# Patient Record
Sex: MALE | Race: WHITE | NOT HISPANIC OR LATINO | URBAN - METROPOLITAN AREA
[De-identification: names, ages, dates, MRNs, and addresses within clinical notes are randomized per-mention and may not be internally consistent; named-entity substitution may affect disease eponyms.]

---

## 2017-02-20 ENCOUNTER — HOME CARE/HOSPICE - HEALTHEAST (OUTPATIENT)
Dept: HOSPICE | Facility: HOSPICE | Age: 82
End: 2017-02-20

## 2017-02-21 ENCOUNTER — HOME CARE/HOSPICE - HEALTHEAST (OUTPATIENT)
Dept: HOSPICE | Facility: HOSPICE | Age: 82
End: 2017-02-21

## 2017-02-22 ENCOUNTER — HOME CARE/HOSPICE - HEALTHEAST (OUTPATIENT)
Dept: HOSPICE | Facility: HOSPICE | Age: 82
End: 2017-02-22

## 2017-02-27 ENCOUNTER — OFFICE VISIT - HEALTHEAST (OUTPATIENT)
Dept: GERIATRICS | Facility: CLINIC | Age: 82
End: 2017-02-27

## 2017-02-27 DIAGNOSIS — R09.02 HYPOXIA: ICD-10-CM

## 2017-02-27 DIAGNOSIS — I63.9 CVA (CEREBRAL VASCULAR ACCIDENT) (H): ICD-10-CM

## 2017-02-27 DIAGNOSIS — G90.9 AUTONOMIC INSTABILITY: ICD-10-CM

## 2017-02-27 DIAGNOSIS — R05.9 COUGH: ICD-10-CM

## 2017-02-27 DIAGNOSIS — R53.1 ACUTE LEFT-SIDED WEAKNESS: ICD-10-CM

## 2017-03-02 ENCOUNTER — OFFICE VISIT - HEALTHEAST (OUTPATIENT)
Dept: GERIATRICS | Facility: CLINIC | Age: 82
End: 2017-03-02

## 2017-03-02 DIAGNOSIS — R09.02 HYPOXIA: ICD-10-CM

## 2017-03-02 DIAGNOSIS — I63.9 CVA (CEREBRAL VASCULAR ACCIDENT) (H): ICD-10-CM

## 2017-03-02 DIAGNOSIS — E86.0 DEHYDRATION: ICD-10-CM

## 2017-03-02 DIAGNOSIS — J18.9 PNEUMONIA: ICD-10-CM

## 2017-03-06 ENCOUNTER — OFFICE VISIT - HEALTHEAST (OUTPATIENT)
Dept: GERIATRICS | Facility: CLINIC | Age: 82
End: 2017-03-06

## 2017-03-06 DIAGNOSIS — E86.0 DEHYDRATION: ICD-10-CM

## 2017-03-06 DIAGNOSIS — R09.02 HYPOXIA: ICD-10-CM

## 2017-03-06 DIAGNOSIS — J18.9 PNEUMONIA: ICD-10-CM

## 2017-03-06 DIAGNOSIS — G90.9 AUTONOMIC INSTABILITY: ICD-10-CM

## 2017-03-09 ENCOUNTER — OFFICE VISIT - HEALTHEAST (OUTPATIENT)
Dept: GERIATRICS | Facility: CLINIC | Age: 82
End: 2017-03-09

## 2017-03-09 DIAGNOSIS — J18.9 PNEUMONIA: ICD-10-CM

## 2017-03-09 DIAGNOSIS — F03.90 DEMENTIA (H): ICD-10-CM

## 2017-03-09 DIAGNOSIS — G90.9 AUTONOMIC INSTABILITY: ICD-10-CM

## 2017-03-09 DIAGNOSIS — R53.1 ACUTE LEFT-SIDED WEAKNESS: ICD-10-CM

## 2017-03-09 DIAGNOSIS — I63.9 CVA (CEREBRAL VASCULAR ACCIDENT) (H): ICD-10-CM

## 2017-03-13 ENCOUNTER — AMBULATORY - HEALTHEAST (OUTPATIENT)
Dept: GERIATRICS | Facility: CLINIC | Age: 82
End: 2017-03-13

## 2021-05-25 ENCOUNTER — RECORDS - HEALTHEAST (OUTPATIENT)
Dept: ADMINISTRATIVE | Facility: CLINIC | Age: 86
End: 2021-05-25

## 2021-05-29 ENCOUNTER — RECORDS - HEALTHEAST (OUTPATIENT)
Dept: ADMINISTRATIVE | Facility: CLINIC | Age: 86
End: 2021-05-29

## 2021-05-31 ENCOUNTER — RECORDS - HEALTHEAST (OUTPATIENT)
Dept: ADMINISTRATIVE | Facility: CLINIC | Age: 86
End: 2021-05-31

## 2021-06-09 NOTE — PROGRESS NOTES
[facility]    Facility:   [facility] [[id number]]   Code Status: DNR/DNI      CHIEF COMPLAINT/REASON FOR VISIT:  Chief Complaint   Patient presents with     Review Of Multiple Medical Conditions       HISTORY:      HPI: Billy is a 85 y.o. male seen today in  up. COntinues to cough throughout the day. Has been needing O2 support 2 L to keep sats upmore than 90%  Not as alert today as he was on admission. Dehydrated. Not drinking as much since placed on Nectar thickened. Speech has started seeing him.   Dicsussed case with Speech. Does not feel like cough is related to swallowing.     Reviewed CXR done yesterday.   Dicsussed case with Niece who is POA    Past Medical History:   Diagnosis Date     Arthritis      Dementia      Glaucoma      prostate     Prostate Cancer HX     Prostate cancer              No family history on file.  Social History     Social History     Marital status: Single     Spouse name: N/A     Number of children: N/A     Years of education: N/A     Social History Main Topics     Smoking status: Never Smoker     Smokeless tobacco: Not on file     Alcohol use No     Drug use: No     Sexual activity: Not on file     Other Topics Concern     Not on file     Social History Narrative    The patient currently (02/20/17) resides at [facility].         Review of Systems  Says he is having difficulty breathing. Not much ROS able to obtain because of discomfort  .There were no vitals filed for this visit.    Physical Exam    VS noted, 92-93% on 2 l nc>  Patient is alert but sleepy,, awake,IN mild  acute respiratory distress. Sitting up. Coughing.   Skin: Warm, and dry. (+) skin tenting,  no lesions,   Head: atraumatic, normocephalic,   Eyes: EOM's intact and conjugate, pink palpebral conjunctivae, anicteric sclerae, pupils reactive  Lungs : Decreased  to auscultation , (+) bilat basal  Rhonchi, no  wheezes   Heart : Nornal first and second heart sounds,  No murmurs, heaves, or thrills  Abdomen: Soft, non tender, non distended, no hepatosplenomegaly, no ascites  Extremities: No edema, pulses are full and equal      LABS:   Recent Results (from the past 168 hour(s))   Electrolyte Profile   Result Value Ref Range    Sodium 138 136 - 145 mmol/L    Potassium 4.0 3.5 - 5.0 mmol/L    CO2 24 22 - 31 mmol/L    Chloride 106 98 - 107 mmol/L    Anion Gap, Calculation 8 5 - 18 mmol/L   Magnesium   Result Value Ref Range    Magnesium 2.0 1.8 - 2.6 mg/dL   Phosphorus   Result Value Ref Range    Phosphorus 3.1 2.5 - 4.5 mg/dL   Renal Function Profile   Result Value Ref Range    Albumin 2.4 (L) 3.5 - 5.0 g/dL    Calcium 8.6 8.5 - 10.5 mg/dL    Phosphorus 3.1 2.5 - 4.5 mg/dL    Glucose 105 70 - 125 mg/dL    BUN 40 (H) 8 - 28 mg/dL    Creatinine 1.12 0.70 - 1.30 mg/dL    Sodium 138 136 - 145 mmol/L    Potassium 4.0 3.5 - 5.0 mmol/L    Chloride 106 98 - 107 mmol/L    CO2 24 22 - 31 mmol/L    Anion Gap, Calculation 8 5 - 18 mmol/L    GFR MDRD Af Amer >60 >60 mL/min/1.73m2    GFR MDRD Non Af Amer >60 >60 mL/min/1.73m2   HM2(CBC w/o Differential)   Result Value Ref Range    WBC 11.7 (H) 4.0 - 11.0 thou/uL    RBC 3.45 (L) 4.40 - 6.20 mill/uL    Hemoglobin 10.4 (L) 14.0 - 18.0 g/dL    Hematocrit 32.3 (L) 40.0 - 54.0 %    MCV 94 80 - 100 fL    MCH 30.1 27.0 - 34.0 pg    MCHC 32.2 32.0 - 36.0 g/dL    RDW 14.6 (H) 11.0 - 14.5 %    Platelets 298 140 - 440 thou/uL    MPV 10.4 8.5 - 12.5 fL         ASSESSMENT:      ICD-10-CM    1. Pneumonia J18.9    2. Hypoxia R09.02    3. Dehydration E86.0    4. CVA (cerebral vascular accident) I63.9        PLAN:    Reviewed XR with RN, as well as discussed case with Niece.   Started on Augmentiin  Discused goals of care with Niece.   Would like to try hydration and continued antibiotic to see if he would improve.   IVF will be started today.   IF no significant improvement, hospice team edwardo roach caled in.   They have held a spot at the [facility] . [facility]  COntinue to  encourage oral fluids ( thickened for now),   Speech working with him but likely not aspiration.       Total 35 minutes of which 55% was spent counseling and coordination of care of the above plan.    Electronically signed by: Cory Grubbs MD   Oxybutynin Counseling:  I discussed with the patient the risks of oxybutynin including but not limited to skin rash, drowsiness, dry mouth, difficulty urinating, and blurred vision.

## 2021-06-09 NOTE — PROGRESS NOTES
[facility]    Facility:   [facility] [[id number]]   Code Status: DNR/DNI  PCP: Jj Hinson MD   Phone: [phone]   Fax: [phone]      CHIEF COMPLAINT/REASON FOR VISIT:  Chief Complaint   Patient presents with     Discharge Summary       HISTORY COURSE:  Billy is a 85 y.o. male who has advanced Alzheimer's dementia, history of glaucoma and prostate cancer and lives at memory care unit facility was admitted to the hospital or complaints of weakness. In the emergency department patient and noted to have hypotension for which she received a fluid flush. Followed by he elevated blood pressures. It was felt that this was related to autonomic dysfunction sec to his advancing dementia. Patient seen by palliative care and discharged back to memory care on 2/20/2017. However he was  sent back to ED for c/o left sided weakness.CT scan revealed multiple chronic small vessel ischemia But the study was poor with no definite ischemia or occlusion. Seen by neuro , felt could have a small ischemic event but, Had no further management recommended.       He developed pneumonia on the left lower lobe during his stay here in the TCU.  Initially was very dehydrated and hypoxic needing oxygen.  He was placed on antibiotics and IV fluids.  After which he improved significantly.  Oxygen was weaned off.  He has gotten back to eating and drinking.  Speech followed him.  It was felt best to put him on nectar thickened liquid to avoid aspiration although there has been no objective documentation of him aspirating.  He has his good days and bad days of being aware and conversant and interactive.  Coughing has significantly lessened.  No fevers of date.    Coughing has significantly lessened.  Fevers    With his niece ensued.  She is being transferred to [facility] and she has been entertaining the thought of hospice.    Review of Systems  unremarkable  There were no vitals filed for  this visit.    Physical Exam  Vital signs were noted and stable.  Patient is alert, awake, oriented toperson, not in acute cardiorespiratory distress  Skin: Warm, and moist,  no lesions,   Head: atraumatic, normocephalic,   Eyes: EOM's intact and conjugate, pink palpebral conjunctivae, anicteric sclerae, pupils reactive  Lungs : Decreased to auscultation , no crackles, wheezes or rhonchi  Heart : Nornal first and second heart sounds, No murmurs, heaves, or thrills  Abdomen: Soft, non tender, non distended, no hepatosplenomegaly, no ascites  Extremities: No edema, pulses are full and equal      MEDICATION LIST:  Current Outpatient Prescriptions   Medication Sig     aspirin 325 MG tablet Take 1 tablet (325 mg total) by mouth daily.     QUEtiapine (SEROQUEL) 25 MG tablet Take 12.5 mg by mouth bedtime as needed. Agitation , 1/2 tablet     QUEtiapine (SEROQUEL) 25 MG tablet Take 12.5 mg by mouth 2 (two) times a day as needed. Agitation     senna-docusate (PERICOLACE) 8.6-50 mg tablet Take 1 tablet by mouth 2 (two) times a day. Do not administer if loose stools.     timolol maleate (TIMOPTIC) 0.5 % ophthalmic solution Administer 1 drop to both eyes every morning. Glaucoma       DISCHARGE DIAGNOSIS:    ICD-10-CM    1. Pneumonia J18.9    2. Autonomic instability G90.9    3. CVA (cerebral vascular accident) I63.9    4. Acute left-sided weakness M62.89    5. Dementia F03.90        MEDICAL EQUIPMENT NEEDS:  None     DISCHARGE PLAN/FACE TO FACE:  I certify that this patient is under my care and that I, or a nurse practitioner or physician's assistant working with me, had a face-to-face encounter that meets the physician face-to-face encounter requirements with this patient.   Date of Face-to-Face Encounter: 3/9/1I certify that, based on my findings, the following services are medically necessary home health services:  Services at [facility]    My clinical findings support the need for the above skilled services because: (Please write  a brief narrative summary that describes what the RN, PT, SLP, or other services will be doing in the home. A list of diagnoses in this section does not meet the CMS requirements.) dementia, debility    This patient is homebound because: (Please write a brief narrative summary describing the functional limitations as to why this patient is homebound and specifically what makes this patient homebound.) same    The patient is, or has been, under my care and I have initiated the establishment of the plan of care. This patient will be followed by a physician who will periodically review the plan of care.    Total time spent was 60 minutes with more than 50% spent on counseling, discussion of treatment plan and extensive review of available records  This note has been dictated using voice recognition software. Any grammatical, typographical, or context distortions are unintentional.        Electronically signed by: Cory Grubbs MD

## 2021-06-09 NOTE — PROGRESS NOTES
[facility]      Facility:    [facility] [[id number]]  Code Status: DNR      Chief Complaint/Reason for Visit:  Chief Complaint   Patient presents with     H & P       HPI:   Billy is a 85 y.o. male who has advanced Alzheimer's dementia, history of glaucoma and prostate cancer and lives at memory care unit facility was admitted to the hospital or complaints of weakness. In the emergency department patient and noted to have hypotension for which she received a fluid flush. Followed by he elevated blood pressures. It was felt that this was related to autonomic dysfunction sec to his advancing dementia. Patient seen by palliative care and discharged back to [facility] on 2/20/2017. However he was  sent back to ED for c/o left sided weakness.CT scan revealed multiple chronic small vessel ischemia But the study was poor with no definite ischemia or occlusion. Seen by neuro , felt could have a small ischemic event but,  Had no further management recommended.      Since last night, patient had been coughing, and  had a transient episode of hypoxia.    Also he fell from his geriatric chair sustaining an abrasion on the posterior thigh was sent to the ER and was sent back to [facility] last night.    Claims to have weakness still on the left side particularly the left leg. He denies any pain. Has been coughing and tachycardic      Past Medical History:  Past Medical History:   Diagnosis Date     Arthritis      Dementia      Glaucoma      prostate     Prostate Cancer HX     Prostate cancer            Surgical History:  No past surgical history on file.    Family History:   No family history on file.    Social History:    Social History     Social History     Marital status: Single     Spouse name: N/A     Number of children: N/A     Years of education: N/A     Social History Main Topics     Smoking status: Never Smoker     Smokeless tobacco: Not on file     Alcohol use No      Drug use: No     Sexual activity: Not on file     Other Topics Concern     Not on file     Social History Narrative    The patient currently (02/20/17) resides at [facility].          Review of Systems  Unreliable because of level of dementia  There were no vitals filed for this visit.    Physical Exam  Vital signs stable however tachycardic  Patient is alert, awake, oriented to time, place and person, not in acute cardiorespiratory distress  Skin: Warm, and moist,  no lesions,   Head: atraumatic, normocephalic,   Eyes: EOM's intact and conjugate, pink palpebral conjunctivae, anicteric sclerae, pupils reactive  Lungs : Noisy breath sounds on  auscultation , no crackles, wheezes or rhonchi  Heart : Nornal first and second heart sounds, No murmurs, heaves, or thrills  Abdomen: Soft, non tender, non distended, no hepatosplenomegaly, no ascites  Extremities: No edema, pulses are full and equal      Medication List:  Current Outpatient Prescriptions   Medication Sig     aspirin 325 MG tablet Take 1 tablet (325 mg total) by mouth daily.     QUEtiapine (SEROQUEL) 25 MG tablet Take 12.5 mg by mouth bedtime. Agitation , 1/2 tablet     QUEtiapine (SEROQUEL) 25 MG tablet Take 12.5 mg by mouth 2 (two) times a day as needed. Agitation     senna-docusate (PERICOLACE) 8.6-50 mg tablet Take 1 tablet by mouth 2 (two) times a day. Do not administer if loose stools.     timolol maleate (TIMOPTIC) 0.5 % ophthalmic solution Administer 1 drop to both eyes every morning. Glaucoma       Labs:        Assessment:    ICD-10-CM    1. CVA (cerebral vascular accident) I63.9    2. Acute left-sided weakness M62.89    3. Cough R05    4. Hypoxia R09.02    5. Autonomic instability G90.9        Plan:  Check Xray  Nectar thickened liquid.   Progressive nature of illness qualifies him for hospice. Family is moving towards hospice.   Loose BP control,   Treat for possible resp infection, await tests.   Total time spent was 60 minutes with  more than 50% spent on counseling, discussion of treatment plan and extensive review of available records        Electronically signed by: Cory Grubbs MD

## 2021-06-09 NOTE — PROGRESS NOTES
[facility]    Facility:   [facility] [[id number]]   Code Status: DNR/DNI      CHIEF COMPLAINT/REASON FOR VISIT:  Chief Complaint   Patient presents with     Review Of Multiple Medical Conditions       HISTORY:      HPI: Billy is a 85 y.o. male Was seen today in follow-up of his multiple medical problems. He was very dehydrated and was coughing and hypoxic late last week. Evaluation revealed pneumonia on bilateral basis. He has been placed on Augmentin he's tolerating this without diarrhea he was also placed on IV fluids for three days. He is much more alert now. Talking and conversant and answering questions appropriately. Eyes are open versus closed/Irma late last week. He is up on his cherry chair  involuntary tremors noted as baseline. Still coughing but not necessarily happening around mealtimes.  Speech therapy has not noted any indication of dysphagia.  Blood pressures higher now at 120 to 130 systolic.  Last bowel movement yesterday.      Past Medical History:   Diagnosis Date     Arthritis      Dementia      Glaucoma      prostate     Prostate Cancer HX     Prostate cancer              No family history on file.  Social History     Social History     Marital status: Single     Spouse name: N/A     Number of children: N/A     Years of education: N/A     Social History Main Topics     Smoking status: Never Smoker     Smokeless tobacco: Not on file     Alcohol use No     Drug use: No     Sexual activity: Not on file     Other Topics Concern     Not on file     Social History Narrative    The patient currently (02/20/17) resides at [facility].         Review of Systems  Unreliable because of patie  .There were no vitals filed for this visit.    Physical Exam    Vital signs stable.92% on room air.  Patient is alert, awake, oriented to time,she knows the place  and person, not in acute cardiorespiratory distress  Skin: Warm, skin turgor is better,  no lesions,    Head: atraumatic, normocephalic,   Eyes: EOM's intact and conjugate, pink palpebral conjunctivae, anicteric sclerae, pupils reactive  Lungs : Decerased to auscultation , minimal bibasal  crackles, wheezes or rhonchi  Heart : Nornal first and second heart sounds, No murmurs, heaves, or thrills  Abdomen: Soft, non tender, mildly  distended, no hepatosplenomegaly, no ascites(+) BS.   Extremities: No edema, pulses are full and equal      LABS:   No results found for this or any previous visit (from the past 72 hour(s)).  No results found for this or any previous visit (from the past 168 hour(s)).      ASSESSMENT:      ICD-10-CM    1. Pneumonia J18.9    2. Hypoxia R09.02    3. Dehydration E86.0    4. Autonomic instability G90.9        PLAN:    Continue antibiotic to completion  Continue withNectar thickened liquids  he was confused last night and wanting to get out of bed keep Seroquel at 12.5 mg as needed.      Total 25 minutes of which 55% was spent counseling and coordination of care of the above plan.    Electronically signed by: Cory Grubbs MD

## 2021-06-15 PROBLEM — R53.1 WEAKNESS: Status: ACTIVE | Noted: 2017-02-18

## 2021-06-15 PROBLEM — I95.9 HYPOTENSION: Status: ACTIVE | Noted: 2017-02-19

## 2021-06-15 PROBLEM — I95.9 HYPOTENSION ARTERIAL: Status: ACTIVE | Noted: 2017-02-19

## 2021-06-15 PROBLEM — G90.9 AUTONOMIC INSTABILITY: Status: ACTIVE | Noted: 2017-02-19

## 2021-06-15 PROBLEM — I63.9 CVA (CEREBRAL VASCULAR ACCIDENT) (H): Status: ACTIVE | Noted: 2017-02-20
